# Patient Record
Sex: MALE | Race: WHITE | NOT HISPANIC OR LATINO | URBAN - METROPOLITAN AREA
[De-identification: names, ages, dates, MRNs, and addresses within clinical notes are randomized per-mention and may not be internally consistent; named-entity substitution may affect disease eponyms.]

---

## 2023-05-22 ENCOUNTER — HOSPITAL ENCOUNTER (OUTPATIENT)
Dept: DATA CONVERSION | Facility: HOSPITAL | Age: 63
End: 2023-05-22
Attending: STUDENT IN AN ORGANIZED HEALTH CARE EDUCATION/TRAINING PROGRAM | Admitting: STUDENT IN AN ORGANIZED HEALTH CARE EDUCATION/TRAINING PROGRAM
Payer: COMMERCIAL

## 2023-05-22 DIAGNOSIS — Z90.6 ACQUIRED ABSENCE OF OTHER PARTS OF URINARY TRACT: ICD-10-CM

## 2023-05-22 DIAGNOSIS — R31.0 GROSS HEMATURIA: ICD-10-CM

## 2023-05-22 DIAGNOSIS — I10 ESSENTIAL (PRIMARY) HYPERTENSION: ICD-10-CM

## 2023-05-22 DIAGNOSIS — Z86.19 PERSONAL HISTORY OF OTHER INFECTIOUS AND PARASITIC DISEASES: ICD-10-CM

## 2023-05-22 DIAGNOSIS — C67.9 MALIGNANT NEOPLASM OF BLADDER, UNSPECIFIED (MULTI): ICD-10-CM

## 2023-05-22 DIAGNOSIS — J44.9 CHRONIC OBSTRUCTIVE PULMONARY DISEASE, UNSPECIFIED (MULTI): ICD-10-CM

## 2023-05-22 DIAGNOSIS — K21.9 GASTRO-ESOPHAGEAL REFLUX DISEASE WITHOUT ESOPHAGITIS: ICD-10-CM

## 2023-05-22 DIAGNOSIS — F25.9 SCHIZOAFFECTIVE DISORDER, UNSPECIFIED (MULTI): ICD-10-CM

## 2023-05-22 DIAGNOSIS — D49.4 NEOPLASM OF UNSPECIFIED BEHAVIOR OF BLADDER: ICD-10-CM

## 2023-05-22 DIAGNOSIS — F31.13 BIPOLAR DISORDER, CURRENT EPISODE MANIC WITHOUT PSYCHOTIC FEATURES, SEVERE (MULTI): ICD-10-CM

## 2023-05-22 DIAGNOSIS — H54.7 UNSPECIFIED VISUAL LOSS: ICD-10-CM

## 2023-05-22 DIAGNOSIS — M10.9 GOUT, UNSPECIFIED: ICD-10-CM

## 2023-05-22 DIAGNOSIS — E78.5 HYPERLIPIDEMIA, UNSPECIFIED: ICD-10-CM

## 2023-05-22 DIAGNOSIS — F17.200 NICOTINE DEPENDENCE, UNSPECIFIED, UNCOMPLICATED: ICD-10-CM

## 2023-09-30 NOTE — H&P
History & Physical Reviewed:   I have reviewed the History and Physical dated:  11-May-2023   History and Physical reviewed and relevant findings noted. Patient examined to review pertinent physical  findings.: No significant changes   Home Medications Reviewed: no changes noted   Allergies Reviewed: no changes noted       ERAS (Enhanced Recovery After Surgery):  ·  ERAS Patient: no     Consent:   COVID-19 Consent:  ·  COVID-19 Risk Consent Surgeon has reviewed key risks related to the risk of kathy COVID-19 and if they contract COVID-19 what the risks are.     Attestation:   Note Completion:  I am a:  Resident/Fellow   Attending Attestation I saw and evaluated the patient.  I personally obtained the key and critical portions of the history and physical exam or was physically present for key and  critical portions performed by the resident/fellow. I reviewed the resident/fellow?s documentation and discussed the patient with the resident/fellow.  I agree with the resident/fellow?s medical decision making as documented in the note.     I personally evaluated the patient on 22-May-2023         Electronic Signatures:  Peña Roland)  (Signed 22-May-2023 14:03)   Authored: Note Completion   Co-Signer: History & Physical Reviewed, ERAS, Consent, Note Completion  Farida Hernandez (Resident))  (Signed 22-May-2023 06:38)   Authored: History & Physical Reviewed, ERAS, Consent,  Note Completion      Last Updated: 22-May-2023 14:03 by Peña Roland)

## 2023-10-02 NOTE — OP NOTE
Post Operative Note:     PreOp Diagnosis: Bladder cancer; Gross hematuria   Post-Procedure Diagnosis: same   Procedure: 1. Cystoscopy   Surgeon: ROSSANA Roland MD   Resident/Fellow/Other Assistant: ROSSANA Reyes MD   Anesthesia: General - ETT   I.V. Fluids: Per Anesthesia   Estimated Blood Loss (mL): none   Blood Replacement: none   Specimen: no   Complications: none   Findings: Bladder mucosa without masses, lesions,  or stones   Patient Returned To/Condition: Returned to PACU in  stable condition   Urine Output: Lost to field   Drains and/or Catheters: none     Operative Report Dictated:  Dictation: not applicable - note contains Operative  Report   Operative Report:    Operative Indication: 62yoM with PMHx of L UTUC s/p distal ureterectomy with reimplant c/b recurrence now s/p partial cystectomy and now metastatic bladder cancer  managed with Keytruda who is having gross hematuria. Here for palliative TURBT.     Risks, benefits, and alternatives were discussed in the preoperative setting and consent was obtained.     Operative Procedure:    The patient was then brought back to the operating room and transferred to the OR table. Time out was performed, antibiotics were given, and anesthesia was inducted. Patient was positioned in dorsal lithotomy, and prepped and draped in the usual sterile  fashion.   A 26Fr rigid continuous flow cystoscope was used to perform cystourethroscopy. Prostate small and non-obstructing.  Bladder was noted to have no masses, lesions, or stones. Right UO was in normal orthotopic position. Left UO posterior lateral along posterior  wall at site of reimplant. Cystoscopy repeated with 70degree lens, confirming no abnormalities. Bladder was drained. This concluded the procedure. Patient was awoken form anesthesia without complication and was transferred to PACU in stable condition.    Disposition: Home.     Attestation:   Note Completion:  I am a: Resident/Fellow   Attending Attestation I was  present for key portions of the procedure and the procedure lasted longer than 5 minutes.          Electronic Signatures:  Peña Roland)  (Signed 22-May-2023 14:06)   Authored: Note Completion   Co-Signer: Post Operative Note, Note Completion  Chino Reyes (Resident))  (Signed 22-May-2023 08:52)   Authored: Post Operative Note, Note Completion      Last Updated: 22-May-2023 14:06 by Peña Roland)

## 2024-03-26 ENCOUNTER — OFFICE VISIT (OUTPATIENT)
Dept: SURGERY | Facility: CLINIC | Age: 64
End: 2024-03-26
Payer: COMMERCIAL

## 2024-03-26 DIAGNOSIS — K40.90 REDUCIBLE RIGHT INGUINAL HERNIA: Primary | ICD-10-CM

## 2024-03-26 PROCEDURE — 99213 OFFICE O/P EST LOW 20 MIN: CPT | Performed by: PHYSICIAN ASSISTANT

## 2024-03-26 RX ORDER — ATORVASTATIN CALCIUM 10 MG/1
TABLET, FILM COATED ORAL
COMMUNITY
Start: 2021-04-08

## 2024-03-26 RX ORDER — ATENOLOL 25 MG/1
TABLET ORAL
COMMUNITY
Start: 2020-10-26

## 2024-03-26 RX ORDER — NALOXONE HYDROCHLORIDE 4 MG/.1ML
SPRAY NASAL
COMMUNITY

## 2024-03-26 RX ORDER — HYDROXYZINE HYDROCHLORIDE 25 MG/1
TABLET, FILM COATED ORAL EVERY 8 HOURS
COMMUNITY
Start: 2024-03-21

## 2024-03-26 RX ORDER — KETOROLAC TROMETHAMINE 10 MG/1
10 TABLET, FILM COATED ORAL EVERY 6 HOURS PRN
COMMUNITY
Start: 2021-02-05

## 2024-03-26 RX ORDER — ASPIRIN 81 MG/1
TABLET ORAL
COMMUNITY

## 2024-03-26 RX ORDER — PEMBROLIZUMAB 25 MG/ML
INJECTION, SOLUTION INTRAVENOUS
COMMUNITY

## 2024-03-26 RX ORDER — AMLODIPINE BESYLATE 5 MG/1
TABLET ORAL
COMMUNITY
Start: 2021-04-17

## 2024-03-26 RX ORDER — OMEPRAZOLE 40 MG/1
CAPSULE, DELAYED RELEASE ORAL
COMMUNITY
Start: 2013-05-10

## 2024-03-26 RX ORDER — ALLOPURINOL 100 MG/1
TABLET ORAL EVERY 24 HOURS
COMMUNITY
Start: 2021-09-30

## 2024-03-26 RX ORDER — DICYCLOMINE HYDROCHLORIDE 10 MG/1
CAPSULE ORAL
COMMUNITY
Start: 2016-04-13

## 2024-03-26 RX ORDER — TRAZODONE HYDROCHLORIDE 150 MG/1
TABLET ORAL
COMMUNITY
Start: 2013-05-14

## 2024-03-26 RX ORDER — ONDANSETRON 4 MG/1
TABLET, ORALLY DISINTEGRATING ORAL EVERY 8 HOURS
COMMUNITY
Start: 2021-02-27

## 2024-03-26 RX ORDER — ACYCLOVIR 50 MG/G
OINTMENT TOPICAL
COMMUNITY
Start: 2021-03-22

## 2024-03-26 RX ORDER — ACETAMINOPHEN 500 MG
TABLET ORAL
COMMUNITY
Start: 2021-09-30

## 2024-03-26 RX ORDER — GABAPENTIN 300 MG/1
300 CAPSULE ORAL
COMMUNITY
Start: 2022-11-03

## 2024-03-26 RX ORDER — HYDROXYZINE PAMOATE 50 MG/1
CAPSULE ORAL
COMMUNITY

## 2024-03-26 NOTE — PROGRESS NOTES
Subjective   Patient ID: Morris Lambert is a 63 y.o. male who presents for New Patient Visit (hernia).    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 Cat and urology.  He is currently under treatment with Dr. Bahena and oncology for his urethral and bladder cancer with mets according to CAT scan.  He gets treatments every 6-week with a drug called pembrolizamaub.  His next treatment is due April 1.  Patient was seen Dr. Bahena also for his pain from his cancers and was on MS Contin this was discontinued at the patient's last visit due to the fact that he had a positive drug screens with amphetamines, ecstasy and cocaine.  Patient since then has been to multiple ERs according to the [facility] report for pain medications and is requesting that here today as well.  Patient is here today for a right inguinal bulge.  He states he is not sure how long it has been there but for at least for a few weeks it is unclear.  States it bulges out when he stands up goes away when he lays down and is causing him some discomfort.      Review of Systems  Review of systems is negative other than what is mentioned above        Physical Exam  Eyes: Conjunctiva non -icteric and eye lids are without obvious rash or drooping. Pupils are symmetric.   Ears, Nose, Mouth, and Throat: External ears and nose appear to be without deformity or rash. No lesions or masses noted. Hearing is grossly intact.   Neck:. No JVD noted, tracheal position is midline. No thyromegaly, no thyroid nodules  Head and Face: Examination of the head and face revealed no abnormalities.   Respiratory: No gasping or shortness of breath noted, no use of accessory muscles noted. Clear to auscultate bilaterally  Cardiovascular: Examination for edema is normal. Regular rate and rhythm, no murmurs   GI: Abdomen non tender to palpation, bowel sounds present no  hepatosplenomegaly  Inguinal:.  Patient has a reducible right inguinal hernia present on exam here today.  Both testes down.  Multiple abdominal scars due to previous bladder and urethral surgeries.  Skin: No rashes or open lesions/ulcers identified on skin.   Musk: Digits/nails show no clubbing or cyanosis. No asymmetry or masses noted of the musculature. Examination of the muscles/joints/bones show normal range of motion. Gait is grossly normally.   Neurologic: Cranial nerves II- XII intact, motor strength 5/5 muscle strength of the lower extremities bilaterally and equal.      Objective     No diagnosis found.   There is no problem list on file for this patient.     No Known Allergies     Medication Documentation Review Audit    **Prior to Admission medications have not yet been reviewed**         Past Medical History:   Diagnosis Date    Myalgia, unspecified site     Muscular aches    Other conditions influencing health status     Viremia    Pain in unspecified joint     Joint pain    Personal history of other specified conditions     History of fatigue     Social History     Tobacco Use   Smoking Status Unknown   Smokeless Tobacco Not on file     No family history on file.   Past Surgical History:   Procedure Laterality Date    OTHER SURGICAL HISTORY  11/23/2021    Incisional hernia repair    SHOULDER SURGERY  11/12/2013    Shoulder Surgery       Assessment/Plan   Today had a discussion with the patient #1 was about pain medications I instructed him I would not be prescribing to him.  That he needs to speak with his oncology team for pain medications.  We also discussed surgical options.  Patient was instructed that we could not do any surgeries until he has completed his cancer treatments.  I did instruct him to come back.  I instructed the patient is to purchase a hernia belt either at the [employer] or online on [employer].  Patient was not happy with any of these suggestions.  He said he would go someplace  else.      Encounter Diagnosis   Name Primary?    Reducible right inguinal hernia Yes     I have reviewed all data including labs,radiologic and previous reports.        **Portions of this medical record have been created using voice recognition software and may have minor errors which are inherent in voice recognition systems. It has not been fully edited for typographical or grammatical errors**

## 2024-06-06 ENCOUNTER — OFFICE VISIT (OUTPATIENT)
Dept: SURGERY | Facility: CLINIC | Age: 64
End: 2024-06-06
Payer: COMMERCIAL

## 2024-06-06 VITALS
SYSTOLIC BLOOD PRESSURE: 132 MMHG | HEART RATE: 81 BPM | HEIGHT: 70 IN | RESPIRATION RATE: 16 BRPM | WEIGHT: 200 LBS | OXYGEN SATURATION: 98 % | DIASTOLIC BLOOD PRESSURE: 78 MMHG | BODY MASS INDEX: 28.63 KG/M2 | TEMPERATURE: 98 F

## 2024-06-06 DIAGNOSIS — K40.90 REDUCIBLE RIGHT INGUINAL HERNIA: Primary | ICD-10-CM

## 2024-06-06 PROCEDURE — 99214 OFFICE O/P EST MOD 30 MIN: CPT | Performed by: SURGERY

## 2024-06-06 NOTE — PROGRESS NOTES
Subjective   Patient ID: Morris Lambert is a 63 y.o. male who presents for Follow-up and Hernia ([facility] had surgery about a year ago and thinks the hernia may be back due to heavy lifting ).  The patient complaints and the reducible lump in the right inguinal area.  I did previous surgery for incisional hernia    HPI as described above.  The patient has a history of renal cancer, surgical intervention for renal cancer, subsequent adjuvant therapy.  Referred for repair of the right inguinal hernia  Review of Systems integumentary consistent with reducible painful lump in the right inguinal area.  Review of other 10 system is negative  Physical Exam pupils equal bilaterally, mucosa moist, bilateral breath sounds, clear to auscultation, regular rhythm and rate, no murmurs.  Scar from previous surgeries.  No evidence of recurrence of the incisional hernia.  Palpable reducible right inguinal hernia.  No other hernias.  Palpable peripheral pulse, no focal neurological motor deficits.  Musculoskeletal exam within normal limits, ENT exam within normal limits    Objective I reviewed all available data including lab results, radiological studies, previous reports and notes.  CT scan showed postsurgical changes, official report did not mention hernias.  I personally reviewed CT scan    No diagnosis found.   There is no problem list on file for this patient.     No Known Allergies   Medication Documentation Review Audit       Reviewed by Cornelio Hughes MD (Physician) on 06/06/24 at 1004      Medication Order Taking? Sig Documenting Provider Last Dose Status   acetaminophen (Tylenol) 500 mg tablet [id number] Yes Take by mouth. Historical Provider, MD Taking Active   acyclovir (Zovirax) 5 % ointment [id number] Yes Acyclovir 5 % External Ointment   Quantity: 15  Refills: 0        Start : 22-Mar-2021   Active Historical Provider, MD Taking Active   allopurinol (Zyloprim) 100 mg tablet [id number] Yes once every 24 hours. Historical  Provider, MD Taking Active   amLODIPine (Norvasc) 5 mg tablet [id number] Yes Take by mouth. Historical Provider, MD Taking Active   aspirin 81 mg EC tablet [id number] Yes Take by mouth. Historical Provider, MD Taking Active   atenolol (Tenormin) 25 mg tablet [id number] Yes Take by mouth. Historical Provider, MD Taking Active   atorvastatin (Lipitor) 10 mg tablet [id number] Yes Take by mouth. Historical Provider, MD Taking Active   dicyclomine (Bentyl) 10 mg capsule [id number] Yes  Historical Provider, MD Taking Active   gabapentin (Neurontin) 300 mg capsule [id number] Yes Take 1 capsule (300 mg) by mouth. Historical Provider, MD Taking Active   hydrOXYzine HCL (Atarax) 25 mg tablet [id number] Yes every 8 hours. Historical Provider, MD Taking Active   hydrOXYzine pamoate (Vistaril) 50 mg capsule [id number] Yes  Historical Provider, MD Taking Active   ketorolac (Toradol) 10 mg tablet [id number] Yes Take 1 tablet (10 mg) by mouth every 6 hours if needed. Historical Provider, MD Taking Active   naloxone (Narcan) 4 mg/0.1 mL nasal spray [id number] Yes USE 1 SPRAY  INTRANASAL  ONCE  MAY REPEAT EVERY 2 TO 3 MINUTES UNTIL PATIENT RESPONDS Nasal for 1 Days Historical Provider, MD Taking Active   omeprazole (PriLOSEC) 40 mg DR capsule [id number] Yes Take by mouth. Historical Provider, MD Taking Active   ondansetron ODT (Zofran-ODT) 4 mg disintegrating tablet [id number] Yes Take by mouth every 8 hours. Historical Provider, MD Taking Active   pembrolizumab (Keytruda) 25 mg/mL chemo injection [id number] Yes as directed Intravenous Historical Provider, MD Taking Active   traZODone (Desyrel) 150 mg tablet [id number] Yes  Historical Provider, MD Taking Active                    Past Medical History:   Diagnosis Date    Myalgia, unspecified site     Muscular aches    Other conditions influencing health status     Viremia    Pain in unspecified joint     Joint pain    Personal history of other specified conditions     History of fatigue     Social  History     Tobacco Use   Smoking Status Every Day    Types: Cigarettes   Smokeless Tobacco Never     No family history on file.   Past Surgical History:   Procedure Laterality Date    OTHER SURGICAL HISTORY  11/23/2021    Incisional hernia repair    SHOULDER SURGERY  11/12/2013    Shoulder Surgery       Assessment/Plan   Patient with a clinical evidence of the reducible right inguinal hernia.  The patient has indication for reducible symptomatic right inguinal hernia repair.  Risks, benefits, alternative treatment were explained to the patient.  All questions were answered.  Informed consent was obtained.      Cornelio Hughes MD

## 2024-08-16 LAB
NON-UH HIE AMPHETAMINES, U: NEGATIVE
NON-UH HIE BARBITUATES, U: NEGATIVE
NON-UH HIE BENZODIAZEPINES, U: NEGATIVE
NON-UH HIE COCAINE, U: NEGATIVE
NON-UH HIE ECSTASY, U: NEGATIVE
NON-UH HIE FENTANYL, U: NEGATIVE
NON-UH HIE OPIATES, U: NEGATIVE
NON-UH HIE PCP, U: NEGATIVE
NON-UH HIE THC, U: NEGATIVE

## 2024-08-30 ENCOUNTER — OFFICE VISIT (OUTPATIENT)
Dept: SURGERY | Facility: CLINIC | Age: 64
End: 2024-08-30
Payer: COMMERCIAL

## 2024-08-30 DIAGNOSIS — Z98.890 STATUS POST BILATERAL INGUINAL HERNIA REPAIR: Primary | ICD-10-CM

## 2024-08-30 DIAGNOSIS — Z87.19 STATUS POST BILATERAL INGUINAL HERNIA REPAIR: Primary | ICD-10-CM

## 2024-08-30 PROCEDURE — 99024 POSTOP FOLLOW-UP VISIT: CPT | Performed by: PHYSICIAN ASSISTANT

## 2024-08-30 NOTE — PROGRESS NOTES
Subjective   Patient ID: Morris Lambert is a 64 y.o. male who presents for Post-op (POV- [facility] done 8/16/24).    HPI  This is a 64-year-old male who is 2 weeks status post a open right inguinal hernia.  Patient has a history of bladder cancer and is currently on Keytruda.  Patient has no complaints.  No pain.  He is moving his bowels.  No nausea no vomiting.  No abdominal pain.    Review of Systems  Review of systems is negative other than what is mentioned above        Physical Exam  Eyes: Conjunctiva non -icteric and eye lids are without obvious rash or drooping. Pupils are symmetric.   Ears, Nose, Mouth, and Throat: External ears and nose appear to be without deformity or rash. No lesions or masses noted. Hearing is grossly intact.   Neck:. No JVD noted, tracheal position is midline. No thyromegaly, no thyroid nodules  Head and Face: Examination of the head and face revealed no abnormalities.   Respiratory: No gasping or shortness of breath noted, no use of accessory muscles noted.  Lungs are clear to auscultate  Cardiovascular: Examination for edema is normal, regular rate and rhythm S1-S2  GI: Abdomen non tender to palpation, bowel sounds are present  Right inguinal area incision is clean dry and intact no erythema no swelling no drainage  Skin: No rashes or open lesions/ulcers identified on skin.   Musk: Digits/nails show no clubbing or cyanosis. No asymmetry or masses noted of the musculature. Examination of the muscles/joints/bones show normal range of motion. Gait is grossly normally.   Neurologic: Cranial nerves II- XII intact, motor strength 5/5 muscle strength of the lower extremities bilaterally and equal.      Objective     No diagnosis found.   There is no problem list on file for this patient.     No Known Allergies   Medication Documentation Review Audit       Reviewed by Ruthie Toure CMA (Medical Assistant) on 08/30/24 at 0858      Medication Order Taking? Sig Documenting Provider Last Dose Status    acetaminophen (Tylenol) 500 mg tablet [id number] No Take by mouth. Historical Provider, MD Taking Active   acyclovir (Zovirax) 5 % ointment [id number] No Acyclovir 5 % External Ointment   Quantity: 15  Refills: 0        Start : 22-Mar-2021   Active Historical Provider, MD Taking Active   allopurinol (Zyloprim) 100 mg tablet [id number] No once every 24 hours. Historical Provider, MD Taking Active   amLODIPine (Norvasc) 5 mg tablet [id number] No Take by mouth. Historical Provider, MD Taking Active   aspirin 81 mg EC tablet [id number] No Take by mouth. Historical Provider, MD Taking Active   atenolol (Tenormin) 25 mg tablet [id number] No Take by mouth. Historical Provider, MD Taking Active   atorvastatin (Lipitor) 10 mg tablet [id number] No Take by mouth. Historical Provider, MD Taking Active   dicyclomine (Bentyl) 10 mg capsule [id number] No  Historical Provider, MD Taking Active   gabapentin (Neurontin) 300 mg capsule [id number] No Take 1 capsule (300 mg) by mouth. Historical Provider, MD Taking Active   hydrOXYzine HCL (Atarax) 25 mg tablet [id number] No every 8 hours. Historical Provider, MD Taking Active   hydrOXYzine pamoate (Vistaril) 50 mg capsule [id number] No  Historical Provider, MD Taking Active   ketorolac (Toradol) 10 mg tablet [id number] No Take 1 tablet (10 mg) by mouth every 6 hours if needed. Historical Provider, MD Taking Active   naloxone (Narcan) 4 mg/0.1 mL nasal spray [id number] No USE 1 SPRAY  INTRANASAL  ONCE  MAY REPEAT EVERY 2 TO 3 MINUTES UNTIL PATIENT RESPONDS Nasal for 1 Days Historical Provider, MD Taking Active   omeprazole (PriLOSEC) 40 mg DR capsule [id number] No Take by mouth. Historical Provider, MD Taking Active   ondansetron ODT (Zofran-ODT) 4 mg disintegrating tablet [id number] No Take by mouth every 8 hours. Historical Provider, MD Taking Active   pembrolizumab (Keytruda) 25 mg/mL chemo injection [id number] No as directed Intravenous Historical MD Caroline Taking Active   traZODone (Desyrel)  150 mg tablet [id number] No  Historical Provider, MD Taking Active                    Past Medical History:   Diagnosis Date    Myalgia, unspecified site     Muscular aches    Other conditions influencing health status     Viremia    Pain in unspecified joint     Joint pain    Personal history of other specified conditions     History of fatigue     Social History     Tobacco Use   Smoking Status Every Day    Types: Cigarettes   Smokeless Tobacco Never     No family history on file.   Past Surgical History:   Procedure Laterality Date    OTHER SURGICAL HISTORY  11/23/2021    Incisional hernia repair    SHOULDER SURGERY  11/12/2013    Shoulder Surgery       Assessment/Plan   No lifting over 10 to 12 pounds for 4 weeks  No swimming pools hot tubs or lakes for 2 weeks  You may ride a stationary bike, you may use a treadmill, you may walk outside.  No squats, sit ups or lunges or core exercises for 4 weeks   You may drive a car  Follow-up as needed    I have reviewed all data including labs,radiologic and previous reports.   **Portions of this medical record have been created using voice recognition software and may have minor errors which we are inherent in voice recognition systems it has not been fully edited for typographical or grammatical errors**        Elvia Ivory PA-C